# Patient Record
Sex: MALE | Race: WHITE | NOT HISPANIC OR LATINO | Employment: OTHER | URBAN - METROPOLITAN AREA
[De-identification: names, ages, dates, MRNs, and addresses within clinical notes are randomized per-mention and may not be internally consistent; named-entity substitution may affect disease eponyms.]

---

## 2018-10-01 ENCOUNTER — OFFICE VISIT (OUTPATIENT)
Dept: FAMILY MEDICINE | Facility: CLINIC | Age: 83
End: 2018-10-01
Payer: MEDICARE

## 2018-10-01 VITALS
BODY MASS INDEX: 18.46 KG/M2 | WEIGHT: 143.81 LBS | TEMPERATURE: 99 F | SYSTOLIC BLOOD PRESSURE: 120 MMHG | DIASTOLIC BLOOD PRESSURE: 88 MMHG | HEIGHT: 74 IN | OXYGEN SATURATION: 98 % | HEART RATE: 72 BPM

## 2018-10-01 DIAGNOSIS — S52.91XS: ICD-10-CM

## 2018-10-01 DIAGNOSIS — I10 ESSENTIAL HYPERTENSION: ICD-10-CM

## 2018-10-01 DIAGNOSIS — I48.0 PAROXYSMAL ATRIAL FIBRILLATION: ICD-10-CM

## 2018-10-01 DIAGNOSIS — M70.21 OLECRANON BURSITIS OF RIGHT ELBOW: Primary | ICD-10-CM

## 2018-10-01 PROCEDURE — 99203 OFFICE O/P NEW LOW 30 MIN: CPT | Mod: S$PBB,,, | Performed by: INTERNAL MEDICINE

## 2018-10-01 PROCEDURE — 99999 PR PBB SHADOW E&M-NEW PATIENT-LVL IV: CPT | Mod: PBBFAC,,, | Performed by: INTERNAL MEDICINE

## 2018-10-01 PROCEDURE — 99204 OFFICE O/P NEW MOD 45 MIN: CPT | Mod: PBBFAC,PO | Performed by: INTERNAL MEDICINE

## 2018-10-01 PROCEDURE — 1100F PTFALLS ASSESS-DOCD GE2>/YR: CPT | Mod: CPTII,,, | Performed by: INTERNAL MEDICINE

## 2018-10-01 PROCEDURE — 3288F FALL RISK ASSESSMENT DOCD: CPT | Mod: CPTII,,, | Performed by: INTERNAL MEDICINE

## 2018-10-01 RX ORDER — ALLOPURINOL 100 MG/1
100 TABLET ORAL DAILY
COMMUNITY

## 2018-10-01 RX ORDER — OMEPRAZOLE 40 MG/1
40 CAPSULE, DELAYED RELEASE ORAL DAILY
COMMUNITY

## 2018-10-01 RX ORDER — TEMAZEPAM 15 MG/1
CAPSULE ORAL
Refills: 0 | COMMUNITY
Start: 2018-09-28

## 2018-10-01 RX ORDER — LANOLIN ALCOHOL/MO/W.PET/CERES
100 CREAM (GRAM) TOPICAL DAILY
COMMUNITY

## 2018-10-01 RX ORDER — MIRTAZAPINE 15 MG/1
TABLET, FILM COATED ORAL
Refills: 3 | COMMUNITY
Start: 2018-09-26

## 2018-10-01 RX ORDER — CLONIDINE HYDROCHLORIDE 0.1 MG/1
TABLET ORAL
Refills: 6 | COMMUNITY
Start: 2018-09-26

## 2018-10-01 RX ORDER — AMLODIPINE BESYLATE 2.5 MG/1
TABLET ORAL
Refills: 1 | COMMUNITY
Start: 2018-08-28

## 2018-10-01 RX ORDER — NAPROXEN SODIUM 220 MG/1
81 TABLET, FILM COATED ORAL DAILY
COMMUNITY

## 2018-10-01 RX ORDER — FINASTERIDE 5 MG/1
5 TABLET, FILM COATED ORAL DAILY
COMMUNITY

## 2018-10-01 RX ORDER — FUROSEMIDE 20 MG/1
TABLET ORAL
Refills: 5 | COMMUNITY
Start: 2018-08-28

## 2018-10-01 NOTE — PROGRESS NOTES
Subjective:       Patient ID: Ed Vega SR. is a 87 y.o. male.    Chief Complaint: Abscess (rt elbow)    Chief Complaint   Patient presents with    Abscess     rt elbow       HPI  Ed Vega SR. is a 87 y.o. male with multiple medical diagnoses as listed in the medical history and problem list that presents for right elbow swelling.  Patient of Dr Gloria HUBER elbow swelling noted first on Saturday - 9/29  No trauma or falls noted  Had recently fractured right forearm on 7/31/18 - had surgical repair of radius/ulna per  Dr Gerson Steinberg at [facility]  States elbow is not painful    No fevers/chills/sweats  No difficulty moving his elbow/forearm  History of gout in other joints - he is on allopurinol daily  Not taking any anti-inflammatory medications     Takes anti-hypertensive medications as prescribed   No recent medication changes      PAST MEDICAL HISTORY:  Past Medical History:   Diagnosis Date    Arthritis     Atrial fibrillation     Cataract     Hypertension        PAST SURGICAL HISTORY:  Past Surgical History:   Procedure Laterality Date    ADENOIDECTOMY      APPENDECTOMY      arm      EYE SURGERY         SOCIAL HISTORY:  Social History     Socioeconomic History    Marital status:      Spouse name: Not on file    Number of children: Not on file    Years of education: Not on file    Highest education level: Not on file   Social Needs    Financial resource strain: Not on file    Food insecurity - worry: Not on file    Food insecurity - inability: Not on file    Transportation needs - medical: Not on file    Transportation needs - non-medical: Not on file   Occupational History    Not on file   Tobacco Use    Smoking status: Never Smoker    Smokeless tobacco: Never Used   Substance and Sexual Activity    Alcohol use: No     Frequency: Never    Drug use: Not on file    Sexual activity: Not on file   Other Topics Concern    Not on file   Social History Narrative     "Not on file       FAMILY HISTORY:  History reviewed. No pertinent family history.    ALLERGIES AND MEDICATIONS: updated and reviewed.  Review of patient's allergies indicates:  No Known Allergies  Current Outpatient Medications   Medication Sig Dispense Refill    allopurinol (ZYLOPRIM) 100 MG tablet Take 100 mg by mouth once daily.      amLODIPine (NORVASC) 2.5 MG tablet TK 1 T PO QD  1    aspirin 81 MG Chew Take 81 mg by mouth once daily.      cloNIDine (CATAPRES) 0.1 MG tablet   6    cyanocobalamin (VITAMIN B-12) 1000 MCG tablet Take 100 mcg by mouth once daily.      finasteride (PROSCAR) 5 mg tablet Take 5 mg by mouth once daily.      FLUAD 9216-7153, 65 YR UP,,PF, 45 mcg (15 mcg x 3)/0.5 mL Syrg ADM 0.5ML IM UTD  0    furosemide (LASIX) 20 MG tablet TK 1 T PO QD  5    mirtazapine (REMERON) 15 MG tablet TK 1 T PO HS  3    omeprazole (PRILOSEC) 40 MG capsule Take 40 mg by mouth once daily.      temazepam (RESTORIL) 15 mg Cap TK ONE C PO QHS  0     No current facility-administered medications for this visit.          ROS  Review of Systems   Constitutional: Negative for chills, diaphoresis, fatigue and fever.   Respiratory: Negative for shortness of breath.    Cardiovascular: Negative for chest pain.   Endocrine: Positive for cold intolerance.   Musculoskeletal:        Right elbow swelling   Skin:        Bruising of upper extremities   Hematological: Bruises/bleeds easily.           Objective:     Physical Exam  Vitals:    10/01/18 1456   BP: 120/88   Pulse: 72   Temp: 98.6 °F (37 °C)   TempSrc: Oral   SpO2: 98%   Weight: 65.2 kg (143 lb 12.8 oz)   Height: 6' 2" (1.88 m)    Body mass index is 18.46 kg/m².  Weight: 65.2 kg (143 lb 12.8 oz)   Height: 6' 2" (188 cm)   Physical Exam   Constitutional: No distress.   Thin, elderly male   HENT:   Head: Normocephalic and atraumatic.   Cardiovascular: Normal rate.   Musculoskeletal: He exhibits no tenderness.   Subcutaneous non-tender swelling of R olecranon " bursa with minimal associated erythema   Neurological: He is alert.   Skin: Skin is warm and dry.   Ecchymoses of bilateral forearms   Psychiatric: He has a normal mood and affect. His behavior is normal.   Vitals reviewed.        Assessment:     1. Olecranon bursitis of right elbow    2. Essential hypertension    3. Paroxysmal atrial fibrillation    4. Fracture of right forearm, sequela      Plan:     Ed was seen today for abscess.    Diagnoses and all orders for this visit:    Olecranon bursitis of right elbow  Counseled patient and patient's spouse on natural history of disease  Recommended Ortho referral for treatment of bursitis - sees Dr Steinberg presently for R forearm fracture s/p reduction in August 2018  -     Ambulatory consult to Orthopedics    Fracture of Right Forearm  Sees Dr Steinberg presently for R forearm fracture s/p reduction in August 20  Follow-up with Ortho / Physical Therapy     Essential Hypertension  BP controlled presently - reviewed anti-hypertensive regimen - continue current therapy    Paroxysmal Atrial Fibrillation  Patient currently on aspirin   Continue follow-up with Cardiology     Health Maintenance    Patient has no pending health maintenance at this time       Follow-up if symptoms worsen or fail to improve.    The patient expressed understanding and no barriers to adherence were identified.     1. The patient indicates understanding of these issues and agrees with the plan. Brief care plan is updated and reviewed with the patient as applicable.     2. The patient is given an After Visit Summary that lists all medications with directions, allergies, orders placed during this encounter and follow-up instructions.     3. I have reviewed the patient's medical information including past medical, family, and social history sections including the medications and allergies.     4. We discussed the patient's current medications. I reconciled the patient's medication list and prepared and  supplied needed refills.     Burt Bello MD  Internal Medicine-Pediatrics
